# Patient Record
Sex: FEMALE | Race: WHITE | NOT HISPANIC OR LATINO | URBAN - METROPOLITAN AREA
[De-identification: names, ages, dates, MRNs, and addresses within clinical notes are randomized per-mention and may not be internally consistent; named-entity substitution may affect disease eponyms.]

---

## 2022-11-17 ENCOUNTER — INPATIENT (INPATIENT)
Facility: HOSPITAL | Age: 32
LOS: 2 days | Discharge: HOME | End: 2022-11-20
Attending: OBSTETRICS & GYNECOLOGY | Admitting: OBSTETRICS & GYNECOLOGY
Payer: COMMERCIAL

## 2022-11-17 VITALS — SYSTOLIC BLOOD PRESSURE: 121 MMHG | DIASTOLIC BLOOD PRESSURE: 68 MMHG | HEART RATE: 93 BPM

## 2022-11-17 DIAGNOSIS — Z90.89 ACQUIRED ABSENCE OF OTHER ORGANS: Chronic | ICD-10-CM

## 2022-11-17 DIAGNOSIS — Z34.80 ENCOUNTER FOR SUPERVISION OF OTHER NORMAL PREGNANCY, UNSPECIFIED TRIMESTER: ICD-10-CM

## 2022-11-17 DIAGNOSIS — O26.899 OTHER SPECIFIED PREGNANCY RELATED CONDITIONS, UNSPECIFIED TRIMESTER: ICD-10-CM

## 2022-11-17 DIAGNOSIS — K08.409 PARTIAL LOSS OF TEETH, UNSPECIFIED CAUSE, UNSPECIFIED CLASS: Chronic | ICD-10-CM

## 2022-11-17 DIAGNOSIS — Z3A.00 WEEKS OF GESTATION OF PREGNANCY NOT SPECIFIED: ICD-10-CM

## 2022-11-17 LAB
BASOPHILS # BLD AUTO: 0.03 K/UL — SIGNIFICANT CHANGE UP (ref 0–0.2)
BASOPHILS NFR BLD AUTO: 0.3 % — SIGNIFICANT CHANGE UP (ref 0–2)
BLD GP AB SCN SERPL QL: NEGATIVE — SIGNIFICANT CHANGE UP
EOSINOPHIL # BLD AUTO: 0.07 K/UL — SIGNIFICANT CHANGE UP (ref 0–0.5)
EOSINOPHIL NFR BLD AUTO: 0.8 % — SIGNIFICANT CHANGE UP (ref 0–6)
HCT VFR BLD CALC: 36.1 % — SIGNIFICANT CHANGE UP (ref 34.5–45)
HGB BLD-MCNC: 12.2 G/DL — SIGNIFICANT CHANGE UP (ref 11.5–15.5)
IMM GRANULOCYTES NFR BLD AUTO: 0.4 % — SIGNIFICANT CHANGE UP (ref 0–0.9)
LYMPHOCYTES # BLD AUTO: 1.42 K/UL — SIGNIFICANT CHANGE UP (ref 1–3.3)
LYMPHOCYTES # BLD AUTO: 15.4 % — SIGNIFICANT CHANGE UP (ref 13–44)
MCHC RBC-ENTMCNC: 28.9 PG — SIGNIFICANT CHANGE UP (ref 27–34)
MCHC RBC-ENTMCNC: 33.8 GM/DL — SIGNIFICANT CHANGE UP (ref 32–36)
MCV RBC AUTO: 85.5 FL — SIGNIFICANT CHANGE UP (ref 80–100)
MONOCYTES # BLD AUTO: 0.57 K/UL — SIGNIFICANT CHANGE UP (ref 0–0.9)
MONOCYTES NFR BLD AUTO: 6.2 % — SIGNIFICANT CHANGE UP (ref 2–14)
NEUTROPHILS # BLD AUTO: 7.09 K/UL — SIGNIFICANT CHANGE UP (ref 1.8–7.4)
NEUTROPHILS NFR BLD AUTO: 76.9 % — SIGNIFICANT CHANGE UP (ref 43–77)
NRBC # BLD: 0 /100 WBCS — SIGNIFICANT CHANGE UP (ref 0–0)
PLATELET # BLD AUTO: 217 K/UL — SIGNIFICANT CHANGE UP (ref 150–400)
RBC # BLD: 4.22 M/UL — SIGNIFICANT CHANGE UP (ref 3.8–5.2)
RBC # FLD: 13.4 % — SIGNIFICANT CHANGE UP (ref 10.3–14.5)
RH IG SCN BLD-IMP: POSITIVE — SIGNIFICANT CHANGE UP
RH IG SCN BLD-IMP: POSITIVE — SIGNIFICANT CHANGE UP
SARS-COV-2 RNA SPEC QL NAA+PROBE: SIGNIFICANT CHANGE UP
WBC # BLD: 9.22 K/UL — SIGNIFICANT CHANGE UP (ref 3.8–10.5)
WBC # FLD AUTO: 9.22 K/UL — SIGNIFICANT CHANGE UP (ref 3.8–10.5)

## 2022-11-17 RX ORDER — OXYTOCIN 10 UNIT/ML
4 VIAL (ML) INJECTION
Qty: 30 | Refills: 0 | Status: DISCONTINUED | OUTPATIENT
Start: 2022-11-17 | End: 2022-11-18

## 2022-11-17 RX ORDER — CITRIC ACID/SODIUM CITRATE 300-500 MG
15 SOLUTION, ORAL ORAL EVERY 6 HOURS
Refills: 0 | Status: DISCONTINUED | OUTPATIENT
Start: 2022-11-17 | End: 2022-11-18

## 2022-11-17 RX ORDER — CHLORHEXIDINE GLUCONATE 213 G/1000ML
1 SOLUTION TOPICAL ONCE
Refills: 0 | Status: DISCONTINUED | OUTPATIENT
Start: 2022-11-17 | End: 2022-11-18

## 2022-11-17 RX ORDER — SODIUM CHLORIDE 9 MG/ML
1000 INJECTION INTRAMUSCULAR; INTRAVENOUS; SUBCUTANEOUS
Refills: 0 | Status: DISCONTINUED | OUTPATIENT
Start: 2022-11-17 | End: 2022-11-20

## 2022-11-17 RX ORDER — OXYTOCIN 10 UNIT/ML
333.33 VIAL (ML) INJECTION
Qty: 20 | Refills: 0 | Status: DISCONTINUED | OUTPATIENT
Start: 2022-11-17 | End: 2022-11-20

## 2022-11-17 RX ORDER — MORPHINE SULFATE 50 MG/1
2 CAPSULE, EXTENDED RELEASE ORAL ONCE
Refills: 0 | Status: DISCONTINUED | OUTPATIENT
Start: 2022-11-18 | End: 2022-11-19

## 2022-11-17 RX ORDER — SODIUM CHLORIDE 9 MG/ML
1000 INJECTION, SOLUTION INTRAVENOUS
Refills: 0 | Status: DISCONTINUED | OUTPATIENT
Start: 2022-11-17 | End: 2022-11-18

## 2022-11-17 RX ORDER — SODIUM CHLORIDE 9 MG/ML
300 INJECTION, SOLUTION INTRAVENOUS
Refills: 0 | Status: DISCONTINUED | OUTPATIENT
Start: 2022-11-17 | End: 2022-11-20

## 2022-11-17 RX ADMIN — SODIUM CHLORIDE 125 MILLILITER(S): 9 INJECTION INTRAMUSCULAR; INTRAVENOUS; SUBCUTANEOUS at 20:31

## 2022-11-17 RX ADMIN — Medication 4 MILLIUNIT(S)/MIN: at 21:21

## 2022-11-17 RX ADMIN — SODIUM CHLORIDE 125 MILLILITER(S): 9 INJECTION, SOLUTION INTRAVENOUS at 19:52

## 2022-11-17 NOTE — OB PROVIDER H&P - HISTORY OF PRESENT ILLNESS
31yo  at 38+5 presenting for IOL for Oligohydramnios discovered today, with MILY of 2.5. Patient denies CTX, LOF, VB. Good FM.     Denies fever, chills, nausea, vomiting, diarrhea, headache, constipation, dizziness, syncope, chest pain, palpitations, shortness of breath, dysuria, urgency, frequency.    PNC: unc  GBS: neg  EFW: 3175    ObHx: G1 current  GynHx: denies  MedHx: denies  SrgHx: denies  PsychHx: denies  SocialHx: denies  AllergyHx: denies  RxHx: denies

## 2022-11-17 NOTE — OB PROVIDER LABOR PROGRESS NOTE - NS_OBIHIFHRDETAILS_OBGYN_ALL_OB_FT
Cat II tracing. moderate variability. + fetal scalp stimulation
Cat II after epidural with a couple of variable decelerations which resolved after ephedrine given

## 2022-11-17 NOTE — OB RN PATIENT PROFILE - FALL HARM RISK - UNIVERSAL INTERVENTIONS
Bed in lowest position, wheels locked, appropriate side rails in place/Call bell, personal items and telephone in reach/Instruct patient to call for assistance before getting out of bed or chair/Non-slip footwear when patient is out of bed/[place] to call system/Physically safe environment - no spills, clutter or unnecessary equipment/Purposeful Proactive Rounding/Room/bathroom lighting operational, light cord in reach

## 2022-11-17 NOTE — OB PROVIDER H&P - ASSESSMENT
31yo  at 38+5 presenting for IOL for oligohydramnios  - Admit to L&D  - Routine labs  - EFM & toco  - CLD & IVF      - GBS neg         - Buccal cytotec and balloon for induction    D/w Dr. Donnie Arzate, PGY2

## 2022-11-17 NOTE — OB PROVIDER H&P - NSHPPHYSICALEXAM_GEN_ALL_CORE
ICU Vital Signs Last 24 Hrs  T(C): --  T(F): --  HR: 93 (17 Nov 2022 13:47) (93 - 93)  BP: 121/68 (17 Nov 2022 13:47) (121/68 - 121/68)  BP(mean): --  ABP: --  ABP(mean): --  RR: --  SpO2: --      Gen: NAD  Abd: Soft, nontender  Ext: No edema, no erythema

## 2022-11-17 NOTE — OB PROVIDER LABOR PROGRESS NOTE - NS_SUBJECTIVE/OBJECTIVE_OBGYN_ALL_OB_FT
Spoke to patient and partner. Decelerations continued and became lates with minimal variability. Recommend CS at this time. Patient and partner in agreement. Reviewed the procedure and all questions answered. Nurse, charge and anesthesia notified    Sil Wilkes DO
patient examined due to cat II tracing. intermittent variable decels. amnioinfusion in place. + fetal scalp stimulation. Exam unchanged 4/80/-3. Head not in pelvis. MVU adequate at 200-250 MVU. Pitocin on 2.     Reviewed with the patient that active labor is not until 6cm however tracing although reassuring with scalp stimulation and variability still Cat II. will continue to watch closely since BP also low and currently getting ephedrine. Continue to monitor closely but if no cervical change despite adequate contractions and cat II tracing remote from delivery will consider CS      Sil Wilkes DO
patient seen due to Cat II tracing. Reviewed with patient and  the tracing with recurrent variable decels. Patient currently with amnioinfusion with no change in exam. Moderate variability. No accels but overall not ominous. Reviewed that if this tracing continues, patient unchanged, remote from delivery will proceed with  section. patient and  is in agreement. If tracing improves will start pitocin. Continue amnioinfusion.     Sil Wilkes DO
Patient seen and examined. Here from office for IOL for oligo. GBS negative. Stated she had been leaking since 6pm. Just got epidural    EFW 8lbs
Pt seen and examined in order to place CB
Pt seen for placement of IUPC/AI. Pt. with reccurent variable decels; pt also comfortable with epidural in place
Pt seen for displacement of cervical mock balloon; SROM, clear. Pt comfortable at this time
Patient comfortable. pitocin on 2. examined due to fetal heart rate

## 2022-11-17 NOTE — OB RN PATIENT PROFILE - NS_PRENATALLABSOURCEGBS36PN_OBGYN_ALL_OB
Instructions:  - continue Apriso 1.5 gm/day  - continue prednisone 40 mg PO daily--we will discuss tapering at your next clinic visit  - low fiber diet   - Continue Calcium 1200 mg daily and vitamin D3 1000 IU daily  - Avoid all NSAIDs (Advil, Ibuprofen, Motrin, Aspirin, Naprosyn, Aleve)  Pap smear recommended every 3 years--due 10/2018  Opthamologic exam recommended yearly--due 2018    Dermatologic exam recommended yearly--due 9/2017   - Use antibiotics with caution  - Vaccines needed:  Flu shot yearly  Tetanus every 10 years  - Follow up with MARK Nichols in 2 weeks with Dr. Walsh in clinic               negative

## 2022-11-17 NOTE — OB PROVIDER LABOR PROGRESS NOTE - ASSESSMENT
A/P:  -continue current management
CB placed, 80cc NS in uterine & vaginal balloons  Pt tolerated well    MEGHAN Arzate, PGY2 
P0 here for IOL for oligo now SROM clear fluid  - Balloon removed by PA  - to start pitocin  - Consents signed. Risks reviewed for vaginal, operative and  delivery.   Consents signed for circumcision. Risks reviewed including bleeding, infection, damage to penis, need for future surgery.  All questions answered    Sil Wilkes DO 
A/P:  -EFM: resuscitative measures prn  -augment with Pitocin  -anesthesia consult for epidural prn  -anticipate     Dr. Wilkes aware
discussed situation with patient and partner. pitocin turned off due to decelerations and Cat II tracing. reviewed positive fetal scalp stimulation and moderate variability however continue Cat II tracing with no cervical change despite adequate contractions. Patient would like to if she can wait a little longer before  section. if variable decels continue to be recurrent would recommend CS as she also is remote from delivery and no cervical change despite adequate contractions.     Sil Wilkes DO

## 2022-11-18 LAB
COVID-19 SPIKE DOMAIN AB INTERP: POSITIVE
COVID-19 SPIKE DOMAIN ANTIBODY RESULT: >250 U/ML — HIGH
SARS-COV-2 IGG+IGM SERPL QL IA: >250 U/ML — HIGH
SARS-COV-2 IGG+IGM SERPL QL IA: POSITIVE
T PALLIDUM AB TITR SER: NEGATIVE — SIGNIFICANT CHANGE UP

## 2022-11-18 PROCEDURE — 59514 CESAREAN DELIVERY ONLY: CPT | Mod: AS,U7

## 2022-11-18 RX ORDER — OXYCODONE HYDROCHLORIDE 5 MG/1
5 TABLET ORAL ONCE
Refills: 0 | Status: DISCONTINUED | OUTPATIENT
Start: 2022-11-18 | End: 2022-11-20

## 2022-11-18 RX ORDER — IBUPROFEN 200 MG
600 TABLET ORAL EVERY 6 HOURS
Refills: 0 | Status: COMPLETED | OUTPATIENT
Start: 2022-11-18 | End: 2023-10-17

## 2022-11-18 RX ORDER — MAGNESIUM HYDROXIDE 400 MG/1
30 TABLET, CHEWABLE ORAL
Refills: 0 | Status: DISCONTINUED | OUTPATIENT
Start: 2022-11-18 | End: 2022-11-20

## 2022-11-18 RX ORDER — ACETAMINOPHEN 500 MG
975 TABLET ORAL
Refills: 0 | Status: DISCONTINUED | OUTPATIENT
Start: 2022-11-18 | End: 2022-11-20

## 2022-11-18 RX ORDER — SODIUM CHLORIDE 9 MG/ML
1000 INJECTION, SOLUTION INTRAVENOUS
Refills: 0 | Status: DISCONTINUED | OUTPATIENT
Start: 2022-11-18 | End: 2022-11-20

## 2022-11-18 RX ORDER — LANOLIN
1 OINTMENT (GRAM) TOPICAL EVERY 6 HOURS
Refills: 0 | Status: DISCONTINUED | OUTPATIENT
Start: 2022-11-18 | End: 2022-11-20

## 2022-11-18 RX ORDER — OXYTOCIN 10 UNIT/ML
333.33 VIAL (ML) INJECTION
Qty: 20 | Refills: 0 | Status: DISCONTINUED | OUTPATIENT
Start: 2022-11-18 | End: 2022-11-20

## 2022-11-18 RX ORDER — HEPARIN SODIUM 5000 [USP'U]/ML
5000 INJECTION INTRAVENOUS; SUBCUTANEOUS EVERY 12 HOURS
Refills: 0 | Status: DISCONTINUED | OUTPATIENT
Start: 2022-11-18 | End: 2022-11-20

## 2022-11-18 RX ORDER — SIMETHICONE 80 MG/1
80 TABLET, CHEWABLE ORAL EVERY 4 HOURS
Refills: 0 | Status: DISCONTINUED | OUTPATIENT
Start: 2022-11-18 | End: 2022-11-20

## 2022-11-18 RX ORDER — IBUPROFEN 200 MG
600 TABLET ORAL EVERY 6 HOURS
Refills: 0 | Status: DISCONTINUED | OUTPATIENT
Start: 2022-11-18 | End: 2022-11-20

## 2022-11-18 RX ORDER — TETANUS TOXOID, REDUCED DIPHTHERIA TOXOID AND ACELLULAR PERTUSSIS VACCINE, ADSORBED 5; 2.5; 8; 8; 2.5 [IU]/.5ML; [IU]/.5ML; UG/.5ML; UG/.5ML; UG/.5ML
0.5 SUSPENSION INTRAMUSCULAR ONCE
Refills: 0 | Status: DISCONTINUED | OUTPATIENT
Start: 2022-11-18 | End: 2022-11-20

## 2022-11-18 RX ORDER — DIPHENHYDRAMINE HCL 50 MG
25 CAPSULE ORAL EVERY 6 HOURS
Refills: 0 | Status: DISCONTINUED | OUTPATIENT
Start: 2022-11-18 | End: 2022-11-20

## 2022-11-18 RX ORDER — OXYCODONE HYDROCHLORIDE 5 MG/1
5 TABLET ORAL
Refills: 0 | Status: COMPLETED | OUTPATIENT
Start: 2022-11-18 | End: 2022-11-25

## 2022-11-18 RX ORDER — KETOROLAC TROMETHAMINE 30 MG/ML
30 SYRINGE (ML) INJECTION EVERY 6 HOURS
Refills: 0 | Status: DISCONTINUED | OUTPATIENT
Start: 2022-11-18 | End: 2022-11-20

## 2022-11-18 RX ADMIN — Medication 975 MILLIGRAM(S): at 21:07

## 2022-11-18 RX ADMIN — Medication 30 MILLIGRAM(S): at 12:25

## 2022-11-18 RX ADMIN — Medication 975 MILLIGRAM(S): at 14:57

## 2022-11-18 RX ADMIN — Medication 30 MILLIGRAM(S): at 17:42

## 2022-11-18 RX ADMIN — Medication 600 MILLIGRAM(S): at 23:45

## 2022-11-18 RX ADMIN — HEPARIN SODIUM 5000 UNIT(S): 5000 INJECTION INTRAVENOUS; SUBCUTANEOUS at 09:17

## 2022-11-18 RX ADMIN — Medication 30 MILLIGRAM(S): at 18:00

## 2022-11-18 RX ADMIN — HEPARIN SODIUM 5000 UNIT(S): 5000 INJECTION INTRAVENOUS; SUBCUTANEOUS at 20:24

## 2022-11-18 RX ADMIN — Medication 1000 MILLIUNIT(S)/MIN: at 02:21

## 2022-11-18 RX ADMIN — Medication 975 MILLIGRAM(S): at 15:35

## 2022-11-18 RX ADMIN — Medication 30 MILLIGRAM(S): at 06:20

## 2022-11-18 RX ADMIN — Medication 975 MILLIGRAM(S): at 09:17

## 2022-11-18 RX ADMIN — Medication 30 MILLIGRAM(S): at 12:10

## 2022-11-18 RX ADMIN — Medication 975 MILLIGRAM(S): at 20:19

## 2022-11-18 RX ADMIN — Medication 30 MILLIGRAM(S): at 00:53

## 2022-11-18 RX ADMIN — Medication 975 MILLIGRAM(S): at 01:00

## 2022-11-18 RX ADMIN — Medication 30 MILLIGRAM(S): at 05:52

## 2022-11-18 NOTE — DISCHARGE NOTE OB - NS MD DC FALL RISK RISK
For information on Fall & Injury Prevention, visit: [email or web address] OR  [email or web address] OR  https://www.cdc.gov/steadi/patient.html

## 2022-11-18 NOTE — DISCHARGE NOTE OB - CARE PLAN
1 Principal Discharge DX:	Single delivery by  section  Assessment and plan of treatment:	Please call if you have fever, heavy vaginal bleeding, signs of wound infection, pain uncontrolled with pain medicine

## 2022-11-18 NOTE — OB RN DELIVERY SUMMARY - NS_SEPSISRSKCALC_OBGYN_ALL_OB_FT
EOS calculated successfully. EOS Risk Factor: 0.5/1000 live births ([facility] national incidence); GA=38w6d; Temp=99.14; ROM=6.533; GBS='Negative'; Antibiotics='No antibiotics or any antibiotics < 2 hrs prior to birth'

## 2022-11-18 NOTE — OB RN DELIVERY SUMMARY - BABY A: APGAR 5 MIN RESP RATE, DELIVERY
Call Patient on regards of her fu apt with Dr Linder on 4/10/20 at [facility] , Dr Linder in not seeing pt in the office but he is giving 2 options for the petients:  1) have a telephone visit  Or'  2)  Re schedule apt  For later date.  MORT  
(1) weak, irregular

## 2022-11-18 NOTE — OB RN INTRAOPERATIVE NOTE - NSSELHIDDEN_OBGYN_ALL_OB_FT
"    PROGRESS NOTE    Corinna Champagne                           Baby's First Name = Nivia  YOB: 2016      Gender: female BW: 7 lb 9.5 oz (3445 g)   Age: 3 days Obstetrician: LULÚ SANDOVAL    Gestational Age: 40w2d Pediatrician:   Dr. Luevano, [facility]     MATERNAL INFORMATION     Mother's Name: Jyoti Champagne    Age: 39 y.o.        PREGNANCY INFORMATION     Maternal /Para:      Information for the patient's mother:  Jyoti Champagne [[id number]]     Patient Active Problem List   Diagnosis   • Annual GYN exam w/o problems   • Subclinical hyperthyroidism   • Postpartum care following vaginal delivery   • Postpartum anemia           MATERNAL PRENATAL LABS:      MBT: O+  RPR: Non-Reactive   RUBELLA: Immune   HBsAg: Negative  HIV: Negative   UDS: + Sept and Oct for opiates, negative since then  GBS Culture: negative      PRENATAL ULTRASOUND :    Normal            MATERNAL MEDICAL, SOCIAL, GENETIC AND FAMILY HISTORY      Past Medical History   Diagnosis Date   • Recurrent pregnancy loss without current pregnancy      homozygous MTHFR (normal homcysteine)   • Subclinical hyperthyroidism 2016         MATERNAL MEDICATIONS     Information for the patient's mother:  Jyoti Champagne [[id number]]            LABOR AND DELIVERY SUMMARY     Rupture date:  2017   Rupture time:  10:05 AM  ROM prior to Delivery: 11h 57m     Antibiotics during Labor: No   [place] Screen: Neg    YOB: 2016   Time of birth:  10:02 PM  Delivery type:  Vaginal, Spontaneous Delivery   Presentation/Position: Vertex;   Occiput Posterior         APGAR SCORES:    Totals: 8   9                  INFORMATION     Vital Signs Temp:  [98.2 °F (36.8 °C)-98.3 °F (36.8 °C)] 98.2 °F (36.8 °C)  Pulse:  [140-152] 140  Resp:  [40-44] 44   Birth Weight: 7 lb 9.5 oz (3445 g)   Birth Length: (inches) 21   Birth Head circumference: Head Cir: 14.17\" (36 cm)     Current Weight: " Weight: 7 lb 0.1 oz (3178 g)   Change in weight since birth: -8%     PHYSICAL EXAMINATION     General appearance Alert and active .    Skin  No rashes or petechiae. Moderate jaundice    HEENT: AFSF.  Positive RR bilaterally. Palate intact.     Normal external ears.    Thorax  Normal    Lungs Clear to auscultation bilaterally, No distress.   Heart  Normal rate and rhythm.  No murmur.   Normal pulses.    Abdomen + BS.  Soft, non-tender. No mass/HSM   Genitalia  normal female exam   Anus Anus patent   Trunk and Spine Spine normal and intact.  No atypical dimpling   Extremities  Clavicles intact.  No hip clicks/clunks.   Neuro Normal reflexes.  Normal Tone     NUTRITIONAL INFORMATION     Feeding plans per mother: breastfeed 5-12 min/fd        LABORATORY AND RADIOLOGY RESULTS     LABS:    Recent Results (from the past 96 hour(s))   POC Glucose Fingerstick    Collection Time: 17 11:09 PM   Result Value Ref Range    Glucose 91 75 - 110 mg/dL   Cord Blood Evaluation    Collection Time: 17  1:00 AM   Result Value Ref Range    ABO Type A     RH type Positive     BULMARO IgG Negative    POC Glucose Fingerstick    Collection Time: 17  2:25 AM   Result Value Ref Range    Glucose 67 (L) 75 - 110 mg/dL   POC Glucose Fingerstick    Collection Time: 17  2:19 AM   Result Value Ref Range    Glucose 58 (L) 75 - 110 mg/dL   Bilirubin,     Collection Time: 17  3:33 AM   Result Value Ref Range    Bilirubin, Direct 0.6 (H) 0.0 - 0.2 mg/dL    Bilirubin, Indirect 8.4 0.6 - 10.5 mg/dL    Total Bilirubin 9.0 0.2 - 12.0 mg/dL   Bilirubin,     Collection Time: 17  7:42 PM   Result Value Ref Range    Bilirubin, Direct 0.5 (H) 0.0 - 0.2 mg/dL    Bilirubin, Indirect 11.2 (H) 0.6 - 10.5 mg/dL    Total Bilirubin 11.7 0.2 - 12.0 mg/dL   Bilirubin,     Collection Time: 17  4:17 AM   Result Value Ref Range    Bilirubin, Direct 0.9 (H) 0.0 - 0.2 mg/dL    Bilirubin, Indirect 13.3 (H) 0.6 - 10.5  mg/dL    Total Bilirubin 14.2 (H) 0.2 - 12.0 mg/dL         ORIN SCORES     Last Score:  Orin  Abstinence Scale Score: 0  Min/Max/Ave for last 24 hrs:  Orin  Abstinence Scale Score  Av.3  Min: 0  Max: 1      HEALTHCARE MAINTENANCE     CCHD Initial CCHD Screening  SpO2: Pre-Ductal (Right Hand): 100 % (17)  SpO2: Post-Ductal (Left Hand/Foot): 98 (17)  Difference in oxygen saturation: 2 (17)  CCHD Screening results: Pass (17)   Car Seat Challenge Test   Not applicable   Hearing Screen Hearing Screen Date: 17 (17)  Hearing Screen Right Ear Abr (Auditory Brainstem Response): passed (17 114)  Hearing Screen Left Ear Abr (Auditory Brainstem Response): passed (17)   [place] Screen  Collected 17     Immunization History   Administered Date(s) Administered   • Hep B, Adolescent or Pediatric 2017       DIAGNOSIS / ASSESSMENT / PLAN OF TREATMENT      Term Infant    ASSESSMENT:   Gestational Age: 40w2d; female  Vaginal, Spontaneous Delivery; Vertex  BW: 7 lb 9.5 oz (3445 g)  Prenatal records, US and labs reviewed.   Family or Maternal History of DDH, CHD, HSV, MRSA or Genetic: Denies  Infant below treatment level for jaundice, but will need follow up on  for bili check.    PLAN:   Normal  care.   Discharge counseling complete, Health Care Maintenance is complete., Pediatrician appointment made, Infant bilirubin below treatment level of 16, Infant feeding well with adequate UOP/Stool and Ready for discharge        Fetal Drug Exposure     ASSESSMENT:  Maternal Hx of opiate use early in pregnancy  UDS positive for opiates in Sept and Oct, negative since then  No signs of active withdrawal   SHARLENE Scores 0-1 since admission.  MSW cleared for discharge with MOB.    PLAN:  CordStat  Follow with MSW as needed.        PENDING RESULTS AT TIME OF DISCHARGE     1) [place] STATE  SCREEN  2) CordStat      Rosa  Abby Castillo MD  2016  10:51 AM     [NS_[email or web address]_ALL_OB_FT:[email or web address]],[NS_DeliveryAssist1_OBGYN_ALL_OB_FT:[id number]=],[NS_DeliveryRN_OBGYN_ALL_OB_FT:[id number]=]

## 2022-11-18 NOTE — DISCHARGE NOTE OB - MATERIALS PROVIDED
[facility] [place] Screening Program/Breastfeeding Log/Bottle Feeding Log/Breastfeeding Mother’s Support Group Information/Guide to Postpartum Care/[facility] Hearing Screen Program/Back To Sleep Handout/Shaken Baby Prevention Handout/Breastfeeding Guide and Packet/Birth Certificate Instructions

## 2022-11-18 NOTE — OB NEONATOLOGY/PEDIATRICIAN DELIVERY SUMMARY - NSPEDSNEONOTESA_OBGYN_ALL_OB_FT
Baby boy, LGA, born on  (00:32) at 38.5 wks via primary CS w/ vaccuum for NRFHT to a 30 y/o , O+ blood type mother. No significant maternal history. Prenatal history of induction for oligo. PNL nr/immune/-, GBS - on , COVID - on . SROM at 18:00 (~7 HRS) with clear fluids. Baby emerged stunned w/d/s/s with APGARS 7/8/9 (for color + RR/cry), had nuchal x1. Mom would like to breast feed, consents Hep B, and consents circ. Tmax: 37.3C. EOS: 0.20.    Physical Exam:  Gen: NAD, +grimace  HEENT: anterior fontanel open soft and flat, no cleft lip/palate, ears normal set, no ear pits or tags. no lesions in mouth/throat, nares clinically patent  Resp: no increased work of breathing, good air entry b/l, clear to auscultation bilaterally  Cardio: Normal S1/S2, regular rate and rhythm, no murmurs, rubs or gallops  Abd: soft, non tender, non distended, + bowel sounds, umbilical cord with 3 vessels  Neuro: +grasp/suck/timbo, normal tone  Extremities: negative jimenez and ortolani, moving all extremities, full range of motion x 4, no crepitus  Skin: pink, warm  Genitals: Normal male anatomy, testicles palpable in scrotum b/l, Juan 1, anus patent

## 2022-11-18 NOTE — DISCHARGE NOTE OB - HOSPITAL COURSE
Patient admitted for oligo. CS done for Cat II tracing remote from delivery. Postpartum course uncomplicated. Discharged home on POD 2 in stable condition.

## 2022-11-18 NOTE — OB PROVIDER DELIVERY SUMMARY - NSSELHIDDEN_OBGYN_ALL_OB_FT
[NS_DeliveryAttending1_OBGYN_ALL_OB_FT:[email or web address]=],[NS_DeliveryAssist1_OBGYN_ALL_OB_FT:[id number]=]

## 2022-11-18 NOTE — DISCHARGE NOTE OB - PLAN OF CARE
Please call if you have fever, heavy vaginal bleeding, signs of wound infection, pain uncontrolled with pain medicine

## 2022-11-18 NOTE — OB RN PREOPERATIVE CHECKLIST - ALLERGIES REVIEWED
Spoke with pt. Wife to give her an update while she is not able to be here due to covid test pending. Very grateful, will continue to update as things change. done

## 2022-11-18 NOTE — DISCHARGE NOTE OB - CARE PROVIDER_API CALL
Sil Wilkes (DO)  [facility] [address], [address]  [place] [address]  Phone: [phone]  Fax: [phone]  Follow Up Time:

## 2022-11-18 NOTE — OB RN DELIVERY SUMMARY - NSSELHIDDEN_OBGYN_ALL_OB_FT
[NS_DeliveryAttending1_OBGYN_ALL_OB_FT:[email or web address]],[NS_DeliveryAssist1_OBGYN_ALL_OB_FT:[id number]=],[NS_DeliveryRN_OBGYN_ALL_OB_FT:[id number]=]

## 2022-11-18 NOTE — DISCHARGE NOTE OB - PATIENT PORTAL LINK FT
You can access the FollowMyHealth Patient Portal offered by [facility] by registering at the following website: [email or web address]. By joining [email or web address]’s FollowMyHealth portal, you will also be able to view your health information using other applications (apps) compatible with our system.

## 2022-11-18 NOTE — OB PROVIDER DELIVERY SUMMARY - NSPROVIDERDELIVERYNOTE_OBGYN_ALL_OB_FT
hysterotomy closed 1 layer  cephalic presentation OP with nuchal  nl tubes and ovaries    EBL 800cc  IVF 1800cc  UO 150cc    Sil Wilkes DO hysterotomy closed 1 layer  cephalic presentation OP with nuchal  nl tubes and ovaries    EBL 800cc  IVF 1800cc  UO 150cc  Dictation #: [id number]    Sil Wilkes DO

## 2022-11-18 NOTE — DISCHARGE NOTE OB - MEDICATION SUMMARY - MEDICATIONS TO TAKE
I will START or STAY ON the medications listed below when I get home from the hospital:    Tylenol 325 mg oral tablet  -- 3 tab(s) by mouth every 6 hours  -- Indication: For pain    ibuprofen 600 mg oral tablet  -- 1 tab(s) by mouth every 6 hours  -- Indication: For  pain     oxyCODONE 5 mg oral tablet  -- 1 tab(s) by mouth every 4 to 6 hours, As Needed -10)  -- Indication: For pain

## 2022-11-19 LAB
HCT VFR BLD CALC: 32 % — LOW (ref 34.5–45)
HGB BLD-MCNC: 10.5 G/DL — LOW (ref 11.5–15.5)
MCHC RBC-ENTMCNC: 29.3 PG — SIGNIFICANT CHANGE UP (ref 27–34)
MCHC RBC-ENTMCNC: 32.8 GM/DL — SIGNIFICANT CHANGE UP (ref 32–36)
MCV RBC AUTO: 89.4 FL — SIGNIFICANT CHANGE UP (ref 80–100)
NRBC # BLD: 0 /100 WBCS — SIGNIFICANT CHANGE UP (ref 0–0)
PLATELET # BLD AUTO: 177 K/UL — SIGNIFICANT CHANGE UP (ref 150–400)
RBC # BLD: 3.58 M/UL — LOW (ref 3.8–5.2)
RBC # FLD: 13.5 % — SIGNIFICANT CHANGE UP (ref 10.3–14.5)
WBC # BLD: 8.56 K/UL — SIGNIFICANT CHANGE UP (ref 3.8–10.5)
WBC # FLD AUTO: 8.56 K/UL — SIGNIFICANT CHANGE UP (ref 3.8–10.5)

## 2022-11-19 RX ORDER — OXYCODONE HYDROCHLORIDE 5 MG/1
5 TABLET ORAL
Refills: 0 | Status: DISCONTINUED | OUTPATIENT
Start: 2022-11-19 | End: 2022-11-20

## 2022-11-19 RX ADMIN — OXYCODONE HYDROCHLORIDE 5 MILLIGRAM(S): 5 TABLET ORAL at 12:30

## 2022-11-19 RX ADMIN — Medication 600 MILLIGRAM(S): at 11:40

## 2022-11-19 RX ADMIN — Medication 600 MILLIGRAM(S): at 18:10

## 2022-11-19 RX ADMIN — Medication 975 MILLIGRAM(S): at 21:15

## 2022-11-19 RX ADMIN — HEPARIN SODIUM 5000 UNIT(S): 5000 INJECTION INTRAVENOUS; SUBCUTANEOUS at 09:39

## 2022-11-19 RX ADMIN — Medication 975 MILLIGRAM(S): at 09:38

## 2022-11-19 RX ADMIN — Medication 975 MILLIGRAM(S): at 10:15

## 2022-11-19 RX ADMIN — Medication 975 MILLIGRAM(S): at 20:37

## 2022-11-19 RX ADMIN — OXYCODONE HYDROCHLORIDE 5 MILLIGRAM(S): 5 TABLET ORAL at 11:40

## 2022-11-19 RX ADMIN — Medication 600 MILLIGRAM(S): at 05:35

## 2022-11-19 RX ADMIN — Medication 600 MILLIGRAM(S): at 17:24

## 2022-11-19 RX ADMIN — Medication 975 MILLIGRAM(S): at 16:00

## 2022-11-19 RX ADMIN — Medication 975 MILLIGRAM(S): at 03:15

## 2022-11-19 RX ADMIN — Medication 600 MILLIGRAM(S): at 06:45

## 2022-11-19 RX ADMIN — Medication 600 MILLIGRAM(S): at 00:15

## 2022-11-19 RX ADMIN — Medication 975 MILLIGRAM(S): at 15:12

## 2022-11-19 RX ADMIN — OXYCODONE HYDROCHLORIDE 5 MILLIGRAM(S): 5 TABLET ORAL at 21:15

## 2022-11-19 RX ADMIN — Medication 600 MILLIGRAM(S): at 23:35

## 2022-11-19 RX ADMIN — OXYCODONE HYDROCHLORIDE 5 MILLIGRAM(S): 5 TABLET ORAL at 16:00

## 2022-11-19 RX ADMIN — OXYCODONE HYDROCHLORIDE 5 MILLIGRAM(S): 5 TABLET ORAL at 20:37

## 2022-11-19 RX ADMIN — Medication 975 MILLIGRAM(S): at 02:34

## 2022-11-19 RX ADMIN — OXYCODONE HYDROCHLORIDE 5 MILLIGRAM(S): 5 TABLET ORAL at 15:12

## 2022-11-19 RX ADMIN — HEPARIN SODIUM 5000 UNIT(S): 5000 INJECTION INTRAVENOUS; SUBCUTANEOUS at 20:37

## 2022-11-19 RX ADMIN — Medication 600 MILLIGRAM(S): at 12:30

## 2022-11-19 NOTE — CHART NOTE - NSCHARTNOTEFT_GEN_A_CORE
Patient attempted to see x2 for AM postpartum rounds, patient in NICU with infant. Will return when patient back in PP room.    Bridget Mendez PGY1

## 2022-11-20 VITALS
SYSTOLIC BLOOD PRESSURE: 126 MMHG | OXYGEN SATURATION: 98 % | DIASTOLIC BLOOD PRESSURE: 84 MMHG | TEMPERATURE: 98 F | HEART RATE: 88 BPM | RESPIRATION RATE: 17 BRPM

## 2022-11-20 PROCEDURE — 85025 COMPLETE CBC W/AUTO DIFF WBC: CPT

## 2022-11-20 PROCEDURE — 86901 BLOOD TYPING SEROLOGIC RH(D): CPT

## 2022-11-20 PROCEDURE — 86769 SARS-COV-2 COVID-19 ANTIBODY: CPT

## 2022-11-20 PROCEDURE — 86850 RBC ANTIBODY SCREEN: CPT

## 2022-11-20 PROCEDURE — 87635 SARS-COV-2 COVID-19 AMP PRB: CPT

## 2022-11-20 PROCEDURE — 59025 FETAL NON-STRESS TEST: CPT

## 2022-11-20 PROCEDURE — 36415 COLL VENOUS BLD VENIPUNCTURE: CPT

## 2022-11-20 PROCEDURE — 59050 FETAL MONITOR W/REPORT: CPT

## 2022-11-20 PROCEDURE — G0463: CPT

## 2022-11-20 PROCEDURE — 86900 BLOOD TYPING SEROLOGIC ABO: CPT

## 2022-11-20 PROCEDURE — 86780 TREPONEMA PALLIDUM: CPT

## 2022-11-20 RX ORDER — IBUPROFEN 200 MG
1 TABLET ORAL
Qty: 0 | Refills: 0 | DISCHARGE
Start: 2022-11-20

## 2022-11-20 RX ORDER — ACETAMINOPHEN 500 MG
3 TABLET ORAL
Qty: 0 | Refills: 0 | DISCHARGE
Start: 2022-11-20

## 2022-11-20 RX ORDER — OXYCODONE HYDROCHLORIDE 5 MG/1
1 TABLET ORAL
Qty: 0 | Refills: 0 | DISCHARGE
Start: 2022-11-20

## 2022-11-20 RX ADMIN — Medication 600 MILLIGRAM(S): at 12:00

## 2022-11-20 RX ADMIN — Medication 975 MILLIGRAM(S): at 02:39

## 2022-11-20 RX ADMIN — Medication 600 MILLIGRAM(S): at 06:23

## 2022-11-20 RX ADMIN — HEPARIN SODIUM 5000 UNIT(S): 5000 INJECTION INTRAVENOUS; SUBCUTANEOUS at 08:41

## 2022-11-20 RX ADMIN — Medication 975 MILLIGRAM(S): at 08:41

## 2022-11-20 RX ADMIN — Medication 975 MILLIGRAM(S): at 03:15

## 2022-11-20 RX ADMIN — Medication 600 MILLIGRAM(S): at 12:30

## 2022-11-20 RX ADMIN — Medication 600 MILLIGRAM(S): at 00:00

## 2022-11-20 RX ADMIN — Medication 975 MILLIGRAM(S): at 09:15

## 2022-11-20 NOTE — PROGRESS NOTE ADULT - SUBJECTIVE AND OBJECTIVE BOX
Day 1 of Anesthesia Pain Management Service    SUBJECTIVE:  Pain Scale Score:          [X] Refer to charted pain scores    THERAPY: Received PF epidural morphine as above    OBJECTIVE:    Sedation:        	[X] Alert	[ ] Drowsy	[ ] Arousable      [ ] Asleep       [ ] Unresponsive    Side Effects:	[X] None	[ ] Nausea	[ ] Vomiting         [ ] Pruritus  		[ ] Weakness            [ ] Numbness	          [ ] Other:    ASSESSMENT/ PLAN  [X] Patient transitioned to prn analgesics  [X] Pain management per primary service, pain service to sign off   [X]Documentation and Verification of current medications
Day 0 of Anesthesia Pain Management Service    SUBJECTIVE: Doing ok  Pain Scale Score:          [X] Refer to charted pain scores    THERAPY:  s/p   2  mg PF epidural morphine on 11\18\2022       MEDICATIONS  (STANDING):  acetaminophen     Tablet .. 975 milliGRAM(s) Oral <User Schedule>  diphtheria/tetanus/pertussis (acellular) Vaccine (Adacel) 0.5 milliLiter(s) IntraMuscular once  heparin   Injectable 5000 Unit(s) SubCutaneous every 12 hours  ibuprofen  Tablet. 600 milliGRAM(s) Oral every 6 hours  ketorolac   Injectable 30 milliGRAM(s) IV Push every 6 hours  lactated ringers. 1000 milliLiter(s) (125 mL/Hr) IV Continuous <Continuous>  lactated ringers. 300 milliLiter(s) (125 mL/Hr) IV Continuous <Continuous>  morphine PF Epidural 2 milliGRAM(s) Epidural once  oxytocin Infusion 333.333 milliUNIT(s)/Min (1000 mL/Hr) IV Continuous <Continuous>  oxytocin Infusion 333.333 milliUNIT(s)/Min (1000 mL/Hr) IV Continuous <Continuous>  sodium chloride 0.9%. 1000 milliLiter(s) (125 mL/Hr) IV Continuous <Continuous>    MEDICATIONS  (PRN):  diphenhydrAMINE 25 milliGRAM(s) Oral every 6 hours PRN Pruritus  lanolin Ointment 1 Application(s) Topical every 6 hours PRN Sore Nipples  magnesium hydroxide Suspension 30 milliLiter(s) Oral two times a day PRN Constipation  oxyCODONE    IR 5 milliGRAM(s) Oral every 3 hours PRN Moderate to Severe Pain (4-10)  oxyCODONE    IR 5 milliGRAM(s) Oral once PRN Moderate to Severe Pain (4-10)  simethicone 80 milliGRAM(s) Chew every 4 hours PRN Gas      OBJECTIVE:    Sedation:        	[X] Alert	 [ ] Drowsy	[ ] Arousable      [ ] Asleep       [ ] Unresponsive    Side Effects:	[X] None 	[ ] Nausea	[ ] Vomiting         [ ] Pruritus  		[ ] Weakness            [ ] Numbness	          [ ] Other:    Vital Signs Last 24 Hrs  T(C): 36.9 (18 Nov 2022 10:00), Max: 37.8 (18 Nov 2022 03:35)  T(F): 98.5 (18 Nov 2022 10:00), Max: 100 (18 Nov 2022 03:35)  HR: 79 (18 Nov 2022 04:35) (70 - 128)  BP: 122/68 (18 Nov 2022 04:35) (80/47 - 132/80)  BP(mean): 81 (18 Nov 2022 03:35) (74 - 81)  RR: 18 (18 Nov 2022 10:00) (16 - 18)  SpO2: 98% (18 Nov 2022 10:00) (92% - 99%)    Parameters below as of 18 Nov 2022 10:00  Patient On (Oxygen Delivery Method): room air        ASSESSMENT/ PLAN  [X] Patient to be transitioned to prn analgesics after 24 hours  [X] Pain management per primary service, pain service to sign off   [X]Documentation and Verification of current medications
OB Attending Note      S: Pt feeling well, +FM, pain controlled    Physical exam:    Vital Signs Last 24 Hrs  T(C): 36.9 (19 Nov 2022 05:44), Max: 37.3 (18 Nov 2022 12:48)  T(F): 98.4 (19 Nov 2022 05:44), Max: 99.1 (18 Nov 2022 12:48)  HR: 82 (19 Nov 2022 05:44) (71 - 88)  BP: 93/61 (19 Nov 2022 05:44) (93/61 - 109/68)  BP(mean): --  RR: 18 (19 Nov 2022 05:44) (18 - 18)  SpO2: 97% (19 Nov 2022 05:44) (96% - 99%)    Parameters below as of 19 Nov 2022 05:44  Patient On (Oxygen Delivery Method): room air      I&O's Summary    18 Nov 2022 07:01  -  19 Nov 2022 07:00  --------------------------------------------------------  IN: 0 mL / OUT: 2500 mL / NET: -2500 mL        Gen: NAD  Breast: Soft, nontender, not engorged.  Abdomen: Soft, nontender, no distension , firm uterine fundus at umbilicus.  Incision: Clean, dry, and intact with steri strips  Scant Lochia  Ext: No calf tenderness    LABS:                        10.5   8.56  )-----------( 177      ( 19 Nov 2022 07:23 )             32.0                         12.2   9.22  )-----------( 217      ( 17 Nov 2022 16:18 )             36.1                                 
OB Progress Note: LTCS, POD#2    S: 32y POD#2 s/p pLTCS for cat2. Pain is well controlled. She is tolerating a regular diet and passing flatus. She is voiding spontaneously, and ambulating without difficulty. Denies CP/SOB. Denies lightheadedness/dizziness. Denies N/V.    O:  Vitals:  Vital Signs Last 24 Hrs  T(C): 36.8 (19 Nov 2022 21:11), Max: 36.9 (19 Nov 2022 05:44)  T(F): 98.3 (19 Nov 2022 21:11), Max: 98.4 (19 Nov 2022 05:44)  HR: 83 (19 Nov 2022 21:11) (77 - 83)  BP: 107/73 (19 Nov 2022 21:11) (93/61 - 112/74)  BP(mean): --  RR: 17 (19 Nov 2022 21:11) (17 - 18)  SpO2: 97% (19 Nov 2022 21:11) (97% - 98%)    Parameters below as of 19 Nov 2022 21:11  Patient On (Oxygen Delivery Method): room air        MEDICATIONS  (STANDING):  acetaminophen     Tablet .. 975 milliGRAM(s) Oral <User Schedule>  diphtheria/tetanus/pertussis (acellular) Vaccine (Adacel) 0.5 milliLiter(s) IntraMuscular once  heparin   Injectable 5000 Unit(s) SubCutaneous every 12 hours  ibuprofen  Tablet. 600 milliGRAM(s) Oral every 6 hours  lactated ringers. 1000 milliLiter(s) (125 mL/Hr) IV Continuous <Continuous>  lactated ringers. 300 milliLiter(s) (125 mL/Hr) IV Continuous <Continuous>  oxytocin Infusion 333.333 milliUNIT(s)/Min (1000 mL/Hr) IV Continuous <Continuous>  oxytocin Infusion 333.333 milliUNIT(s)/Min (1000 mL/Hr) IV Continuous <Continuous>  sodium chloride 0.9%. 1000 milliLiter(s) (125 mL/Hr) IV Continuous <Continuous>      MEDICATIONS  (PRN):  diphenhydrAMINE 25 milliGRAM(s) Oral every 6 hours PRN Pruritus  lanolin Ointment 1 Application(s) Topical every 6 hours PRN Sore Nipples  magnesium hydroxide Suspension 30 milliLiter(s) Oral two times a day PRN Constipation  oxyCODONE    IR 5 milliGRAM(s) Oral once PRN Moderate to Severe Pain (4-10)  oxyCODONE    IR 5 milliGRAM(s) Oral every 3 hours PRN Moderate to Severe Pain (4-10)  simethicone 80 milliGRAM(s) Chew every 4 hours PRN Gas      Labs:  Blood type: O Positive  Rubella IgG: RPR: Negative                          10.5<L>   8.56 >-----------< 177    ( 11-19 @ 07:23 )             32.0<L>                        12.2   9.22 >-----------< 217    ( 11-17 @ 16:18 )             36.1          PE:  General: NAD. Sitting up in bed comfortably prior to eval  Pulm: Unlabored breathing. No respiratory distress  Abdomen: Incision c/d/i. Non-tender. Soft abdomen   Extremities: No calf tenderness bilaterally.

## 2022-11-20 NOTE — PROGRESS NOTE ADULT - ATTENDING COMMENTS
POD2 s/p  section, doing well  -Routine postoperative care  -Discharge home   -Instructions given   -Follow-up in office 2 weeks    Monika Fields DO

## 2022-11-20 NOTE — PROGRESS NOTE ADULT - ASSESSMENT
A/P: 32y POD#2  s/p pLTCS for cat2. Patient is progressing appropriately post-operatively   - Continue regular diet.  - Encourage ambulation  - Continue motrin, tylenol, oxycodone PRN for pain control    Taj Noe, PGY-1  Obstetrics and Gynecology
Assessment and Plan  POD #1 s/p  section  Doing well.  Continue Ambulation/OOB/Venodynes/heparin  Cont Pain medications  Regular diet    Monika Fields, DO

## 2023-05-08 NOTE — OB PROVIDER H&P - NS_PARA_OBGYN_ALL_OB_NU
normal appearance,  without tenderness upon palpation,  no deformities,  no masses,  no thyroid nodules,  Thyroid normal size 0

## 2023-05-22 ENCOUNTER — APPOINTMENT (OUTPATIENT)
Dept: INTERNAL MEDICINE | Facility: CLINIC | Age: 33
End: 2023-05-22
Payer: COMMERCIAL

## 2023-05-22 VITALS
OXYGEN SATURATION: 98 % | SYSTOLIC BLOOD PRESSURE: 113 MMHG | DIASTOLIC BLOOD PRESSURE: 69 MMHG | TEMPERATURE: 97.3 F | HEART RATE: 64 BPM | WEIGHT: 189 LBS | BODY MASS INDEX: 35.68 KG/M2 | HEIGHT: 61 IN

## 2023-05-22 DIAGNOSIS — Z00.00 ENCOUNTER FOR GENERAL ADULT MEDICAL EXAMINATION W/OUT ABNORMAL FINDINGS: ICD-10-CM

## 2023-05-22 DIAGNOSIS — Z13.29 ENCOUNTER FOR SCREENING FOR OTHER SUSPECTED ENDOCRINE DISORDER: ICD-10-CM

## 2023-05-22 DIAGNOSIS — Z82.49 FAMILY HISTORY OF ISCHEMIC HEART DISEASE AND OTHER DISEASES OF THE CIRCULATORY SYSTEM: ICD-10-CM

## 2023-05-22 DIAGNOSIS — Z71.3 DIETARY COUNSELING AND SURVEILLANCE: ICD-10-CM

## 2023-05-22 DIAGNOSIS — Z13.0 ENCOUNTER FOR SCREENING FOR DISEASES OF THE BLOOD AND BLOOD-FORMING ORGANS AND CERTAIN DISORDERS INVOLVING THE IMMUNE MECHANISM: ICD-10-CM

## 2023-05-22 DIAGNOSIS — Z12.4 ENCOUNTER FOR SCREENING FOR MALIGNANT NEOPLASM OF CERVIX: ICD-10-CM

## 2023-05-22 DIAGNOSIS — Z78.9 OTHER SPECIFIED HEALTH STATUS: ICD-10-CM

## 2023-05-22 DIAGNOSIS — Z12.83 ENCOUNTER FOR SCREENING FOR MALIGNANT NEOPLASM OF SKIN: ICD-10-CM

## 2023-05-22 DIAGNOSIS — E66.9 OBESITY, UNSPECIFIED: ICD-10-CM

## 2023-05-22 PROCEDURE — G0447 BEHAVIOR COUNSEL OBESITY 15M: CPT

## 2023-05-22 PROCEDURE — 99385 PREV VISIT NEW AGE 18-39: CPT | Mod: 25

## 2023-05-23 LAB
25(OH)D3 SERPL-MCNC: 24.8 NG/ML
ALBUMIN SERPL ELPH-MCNC: 4.5 G/DL
ALP BLD-CCNC: 85 U/L
ALT SERPL-CCNC: 13 U/L
ANION GAP SERPL CALC-SCNC: 11 MMOL/L
APPEARANCE: CLEAR
AST SERPL-CCNC: 15 U/L
BILIRUB SERPL-MCNC: 0.4 MG/DL
BILIRUBIN URINE: NEGATIVE
BLOOD URINE: NEGATIVE
BUN SERPL-MCNC: 9 MG/DL
CALCIUM SERPL-MCNC: 9.6 MG/DL
CHLORIDE SERPL-SCNC: 104 MMOL/L
CHOLEST SERPL-MCNC: 191 MG/DL
CO2 SERPL-SCNC: 24 MMOL/L
COLOR: YELLOW
CREAT SERPL-MCNC: 0.68 MG/DL
EGFR: 119 ML/MIN/1.73M2
ESTIMATED AVERAGE GLUCOSE: 100 MG/DL
GLUCOSE QUALITATIVE U: NEGATIVE MG/DL
GLUCOSE SERPL-MCNC: 90 MG/DL
HBA1C MFR BLD HPLC: 5.1 %
HDLC SERPL-MCNC: 62 MG/DL
KETONES URINE: NEGATIVE MG/DL
LDLC SERPL CALC-MCNC: 110 MG/DL
LEUKOCYTE ESTERASE URINE: NEGATIVE
NITRITE URINE: NEGATIVE
NONHDLC SERPL-MCNC: 129 MG/DL
PH URINE: 6
POTASSIUM SERPL-SCNC: 4.2 MMOL/L
PROT SERPL-MCNC: 7.1 G/DL
PROTEIN URINE: NEGATIVE MG/DL
SODIUM SERPL-SCNC: 139 MMOL/L
SPECIFIC GRAVITY URINE: 1.01
TRIGL SERPL-MCNC: 95 MG/DL
TSH SERPL-ACNC: 0.69 UIU/ML
UROBILINOGEN URINE: 0.2 MG/DL

## 2023-09-14 NOTE — OB RN DELIVERY SUMMARY - NS_GBSABX_OBGYN_ALL_OB
Health Maintenance Due   Topic Date Due   • Pneumococcal Vaccine 0-64 (1 - PCV) Never done   • COVID-19 Vaccine (3 - Pfizer series) 05/12/2021   • TDaP Pregnancy Vaccine  09/01/2023   • Influenza Vaccine (1) 09/01/2023   • Depression Screening  03/02/2024       Patient is due for topics as listed above but is not proceeding with Immunization(s) COVID-19, Dtap/Tdap/Td, Influenza and Pneumococcal at this time. Education provided for Immunization(s) COVID-19, Dtap/Tdap/Td, Influenza and Pneumococcal.   N/A

## 2024-04-18 ENCOUNTER — NON-APPOINTMENT (OUTPATIENT)
Age: 34
End: 2024-04-18

## 2024-04-19 ENCOUNTER — APPOINTMENT (OUTPATIENT)
Dept: ORTHOPEDIC SURGERY | Facility: CLINIC | Age: 34
End: 2024-04-19
Payer: COMMERCIAL

## 2024-04-19 ENCOUNTER — NON-APPOINTMENT (OUTPATIENT)
Age: 34
End: 2024-04-19

## 2024-04-19 DIAGNOSIS — M77.01 MEDIAL EPICONDYLITIS, RIGHT ELBOW: ICD-10-CM

## 2024-04-19 PROCEDURE — 73080 X-RAY EXAM OF ELBOW: CPT | Mod: RT

## 2024-04-19 PROCEDURE — 99203 OFFICE O/P NEW LOW 30 MIN: CPT

## 2024-04-19 RX ORDER — DICLOFENAC SODIUM 1% 10 MG/G
1 GEL TOPICAL
Qty: 100 | Refills: 0 | Status: ACTIVE | COMMUNITY
Start: 2024-04-19 | End: 1900-01-01

## 2024-07-25 ENCOUNTER — APPOINTMENT (OUTPATIENT)
Dept: INTERNAL MEDICINE | Facility: CLINIC | Age: 34
End: 2024-07-25
Payer: COMMERCIAL

## 2024-07-25 VITALS
RESPIRATION RATE: 17 BRPM | WEIGHT: 175 LBS | OXYGEN SATURATION: 98 % | DIASTOLIC BLOOD PRESSURE: 76 MMHG | TEMPERATURE: 97.6 F | HEIGHT: 61 IN | HEART RATE: 77 BPM | SYSTOLIC BLOOD PRESSURE: 113 MMHG | BODY MASS INDEX: 33.04 KG/M2

## 2024-07-25 DIAGNOSIS — Z00.00 ENCOUNTER FOR GENERAL ADULT MEDICAL EXAMINATION W/OUT ABNORMAL FINDINGS: ICD-10-CM

## 2024-07-25 DIAGNOSIS — G43.E09 CHRONIC MIGRAINE WITH AURA, NOT INTRACTABLE, WITHOUT STATUS MIGRAINOSUS: ICD-10-CM

## 2024-07-25 DIAGNOSIS — E66.9 OBESITY, UNSPECIFIED: ICD-10-CM

## 2024-07-25 PROCEDURE — 99395 PREV VISIT EST AGE 18-39: CPT

## 2024-07-26 LAB
25(OH)D3 SERPL-MCNC: 25.6 NG/ML
ALBUMIN SERPL ELPH-MCNC: 4.3 G/DL
ALP BLD-CCNC: 67 U/L
ALT SERPL-CCNC: 15 U/L
ANION GAP SERPL CALC-SCNC: 11 MMOL/L
APPEARANCE: CLEAR
AST SERPL-CCNC: 18 U/L
BASOPHILS # BLD AUTO: 0.07 K/UL
BASOPHILS NFR BLD AUTO: 1.2 %
BILIRUB SERPL-MCNC: 0.3 MG/DL
BILIRUBIN URINE: NEGATIVE
BLOOD URINE: NEGATIVE
BUN SERPL-MCNC: 12 MG/DL
CALCIUM SERPL-MCNC: 9.1 MG/DL
CHLORIDE SERPL-SCNC: 106 MMOL/L
CHOLEST SERPL-MCNC: 175 MG/DL
CO2 SERPL-SCNC: 21 MMOL/L
COLOR: YELLOW
CREAT SERPL-MCNC: 0.78 MG/DL
EGFR: 103 ML/MIN/1.73M2
EOSINOPHIL # BLD AUTO: 0.13 K/UL
EOSINOPHIL NFR BLD AUTO: 2.2 %
ESTIMATED AVERAGE GLUCOSE: 103 MG/DL
GLUCOSE QUALITATIVE U: NEGATIVE MG/DL
GLUCOSE SERPL-MCNC: 99 MG/DL
HBA1C MFR BLD HPLC: 5.2 %
HCT VFR BLD CALC: 40.5 %
HDLC SERPL-MCNC: 49 MG/DL
HGB BLD-MCNC: 13 G/DL
IMM GRANULOCYTES NFR BLD AUTO: 0.3 %
KETONES URINE: NEGATIVE MG/DL
LDLC SERPL CALC-MCNC: 113 MG/DL
LEUKOCYTE ESTERASE URINE: NEGATIVE
LYMPHOCYTES # BLD AUTO: 1.93 K/UL
LYMPHOCYTES NFR BLD AUTO: 32.1 %
MAN DIFF?: NORMAL
MCHC RBC-ENTMCNC: 28.3 PG
MCHC RBC-ENTMCNC: 32.1 GM/DL
MCV RBC AUTO: 88 FL
MONOCYTES # BLD AUTO: 0.36 K/UL
MONOCYTES NFR BLD AUTO: 6 %
NEUTROPHILS # BLD AUTO: 3.51 K/UL
NEUTROPHILS NFR BLD AUTO: 58.2 %
NITRITE URINE: NEGATIVE
NONHDLC SERPL-MCNC: 126 MG/DL
PH URINE: 6
PLATELET # BLD AUTO: 327 K/UL
POTASSIUM SERPL-SCNC: 4.6 MMOL/L
PROT SERPL-MCNC: 6.9 G/DL
PROTEIN URINE: NEGATIVE MG/DL
RBC # BLD: 4.6 M/UL
RBC # FLD: 13.3 %
SODIUM SERPL-SCNC: 138 MMOL/L
SPECIFIC GRAVITY URINE: 1.01
TRIGL SERPL-MCNC: 68 MG/DL
TSH SERPL-ACNC: 1.54 UIU/ML
UROBILINOGEN URINE: 0.2 MG/DL
WBC # FLD AUTO: 6.02 K/UL
